# Patient Record
Sex: FEMALE | Race: BLACK OR AFRICAN AMERICAN | URBAN - METROPOLITAN AREA
[De-identification: names, ages, dates, MRNs, and addresses within clinical notes are randomized per-mention and may not be internally consistent; named-entity substitution may affect disease eponyms.]

---
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
## 2022-08-13 ENCOUNTER — APPOINTMENT (OUTPATIENT)
Dept: ULTRASOUND IMAGING | Age: 22
End: 2022-08-13
Attending: EMERGENCY MEDICINE
Payer: MEDICAID

## 2022-08-13 ENCOUNTER — HOSPITAL ENCOUNTER (EMERGENCY)
Age: 22
Discharge: HOME | End: 2022-08-14
Attending: EMERGENCY MEDICINE | Admitting: EMERGENCY MEDICINE
Payer: MEDICAID

## 2022-08-13 VITALS
RESPIRATION RATE: 16 BRPM | SYSTOLIC BLOOD PRESSURE: 108 MMHG | HEART RATE: 54 BPM | WEIGHT: 146.39 LBS | OXYGEN SATURATION: 100 % | TEMPERATURE: 97.8 F | DIASTOLIC BLOOD PRESSURE: 72 MMHG

## 2022-08-13 DIAGNOSIS — Z32.01 POSITIVE PREGNANCY TEST: Primary | ICD-10-CM

## 2022-08-13 LAB — HCG UR QL: NEGATIVE

## 2022-08-13 PROCEDURE — 99284 EMERGENCY DEPT VISIT MOD MDM: CPT

## 2022-08-13 PROCEDURE — 99284 EMERGENCY DEPT VISIT MOD MDM: CPT | Performed by: EMERGENCY MEDICINE

## 2022-08-13 PROCEDURE — 76801 OB US < 14 WKS SINGLE FETUS: CPT

## 2022-08-13 PROCEDURE — 81025 URINE PREGNANCY TEST: CPT

## 2022-08-13 PROCEDURE — 76817 TRANSVAGINAL US OBSTETRIC: CPT

## 2022-08-14 NOTE — ED TRIAGE NOTES
Pt states she had a blood test for a possible pregnancy today and was told it was positive. Came here exclusively for an [address].

## 2022-08-14 NOTE — DISCHARGE INSTRUCTIONS
Your ultrasound did not show a pregnancy. THIS DOES NOT MEAN YOU ARE NOT PREGNANT. A positive pregnancy test (blood or urine) is positive well before you can see a pregnancy on ultrasound. You should continue to check urine pregnancy tests over the course of the next few weeks.

## 2022-08-14 NOTE — ED NOTES
MD aware of HR at discharge. Patient educated that although ultrasound is negative, she could still be pregnant. Patient instructed to follow up with OBGYN and to not drink alcohol/use drugs until it is definitely determined whether or not she is pregnant. Patient verbalizes understanding. Pt discharged home with boyfriend. Pt acting age appropriately, respirations regular and unlabored, cap refill less than two seconds. Skin warm, dry, and intact. Lungs clear bilaterally. No further complaints at this time. Patient verbalized understanding of discharge paperwork and has no further questions at this time. Education provided about continuation of care, follow up care and medication administration. Patient able to provide teach back about discharge instructions.

## 2022-08-14 NOTE — ED PROVIDER NOTES
HPI     Healthy 3year-old female here for a pelvic ultrasound. She says she was at [facility] earlier today and had a negative urine pregnancy test but had a positive blood pregnancy test.  She says the level in the blood test was \"5\". She says they told her they would not do an ultrasound in the emergency department but she can have one done as an outpatient. She left and came here to have the ultrasound done. She is not having abdominal pain. No vaginal discharge or bleeding. She has no complaints at all. Her last period was 3 months ago. She is sexually active. Past Medical History:   Diagnosis Date    Bipolar 1 disorder ([facility] [address] [facility]     [facility]     Mood disorder ([facility]        History reviewed. No pertinent surgical history. Family History:   Problem Relation Age of Onset    No Known Problems Mother        Social History     Socioeconomic History    Marital status: SINGLE     Spouse name: Not on file    Number of children: Not on file    Years of education: Not on file    Highest education level: Not on file   Occupational History    Not on file   Tobacco Use    Smoking status: Every Day     Packs/day: 0.25     Years: 2.00     Pack years: 0.50     Types: Cigarettes    Smokeless tobacco: Current   Substance and Sexual Activity    Alcohol use: Yes    Drug use: Yes     Types: Marijuana    Sexual activity: Yes     Partners: Male     Birth control/protection: None   Other Topics Concern    Not on file   Social History Narrative    Not on file     Social Determinants of Health     Financial Resource Strain: Not on file   Food Insecurity: Not on file   Transportation Needs: Not on file   Physical Activity: Not on file   Stress: Not on file   Social Connections: Not on file   Intimate Partner Violence: Not on file   Housing Stability: Not on file         ALLERGIES: Chocolate flavor    Review of Systems  Review of Systems   Constitutional: (-) weight loss.    HEENT: (-) stiff neck   Eyes: (-) discharge. Respiratory: (-) cough. Cardiovascular: (-) syncope. Gastrointestinal: (-) blood in stool. Genitourinary: (-) hematuria. Musculoskeletal: (-) myalgias. Neurological: (-) seizure. Skin: (-) petechiae  Lymph/Immunologic: (-) enlarged lymph nodes  All other systems reviewed and are negative. Vitals:    08/13/22 2315   Weight: 66.4 kg (146 lb 6.2 oz)            Physical Exam Nursing note and vitals reviewed. Constitutional: oriented to person, place, and time. appears well-developed and well-nourished. No distress. Head: Normocephalic and atraumatic. Sclera anicteric  Nose: No rhinorrhea  Mouth/Throat: Oropharynx is clear and moist. Pharynx normal  Eyes: Conjunctivae are normal. Pupils are equal, round, and reactive to light. Right eye exhibits no discharge. Left eye exhibits no discharge. Neck: Painless normal range of motion. Neck supple. No LAD. Cardiovascular: Normal rate, regular rhythm, normal heart sounds and intact distal pulses. Exam reveals no gallop and no friction rub. No murmur heard. Pulmonary/Chest:  No respiratory distress. No wheezes. No rales. No rhonchi. No increased work of breathing. No accessory muscle use. Good air exchange throughout. Abdominal: soft, non-tender, no rebound or guarding. No hepatosplenomegaly. Normal bowel sounds throughout. Back: no tenderness to palpation, no deformities, no CVA tenderness  Extremities/Musculoskeletal: Normal range of motion. no tenderness. No edema. Distal extremities are neurovasc intact. Lymphadenopathy:   No adenopathy. Neurological:  Alert and oriented to person, place, and time. Coordination normal. CN 2-12 intact. Motor and sensory function intact. Skin: Skin is warm and dry. No rash noted. No pallor. MDM  61-year-old female here for an ultrasound to confirm pregnancy. I explained to her in great detail that if her serum quant was in fact 5, an ultrasound would not confirm her pregnancy.   Despite this she is insistent on getting ultrasound. It is not really indicated or needed emergently.        Procedures

## 2022-10-02 ENCOUNTER — HOSPITAL ENCOUNTER (EMERGENCY)
Age: 22
Discharge: HOME | End: 2022-10-02
Attending: EMERGENCY MEDICINE
Payer: MEDICAID

## 2022-10-02 ENCOUNTER — APPOINTMENT (OUTPATIENT)
Dept: ULTRASOUND IMAGING | Age: 22
End: 2022-10-02
Attending: NURSE PRACTITIONER
Payer: MEDICAID

## 2022-10-02 VITALS
OXYGEN SATURATION: 100 % | TEMPERATURE: 97.8 F | RESPIRATION RATE: 16 BRPM | HEART RATE: 65 BPM | DIASTOLIC BLOOD PRESSURE: 59 MMHG | SYSTOLIC BLOOD PRESSURE: 110 MMHG

## 2022-10-02 DIAGNOSIS — N93.9 VAGINAL BLEEDING: Primary | ICD-10-CM

## 2022-10-02 DIAGNOSIS — E87.6 HYPOKALEMIA: ICD-10-CM

## 2022-10-02 DIAGNOSIS — N83.202 LEFT OVARIAN CYST: ICD-10-CM

## 2022-10-02 DIAGNOSIS — R10.13 ABDOMINAL PAIN, EPIGASTRIC: ICD-10-CM

## 2022-10-02 LAB
ABO + RH BLD: NORMAL
ALBUMIN SERPL-MCNC: 3.9 G/DL (ref 3.5–5)
ALBUMIN/GLOB SERPL: 0.9 {RATIO} (ref 1.1–2.2)
ALP SERPL-CCNC: 63 U/L (ref 45–117)
ALT SERPL-CCNC: 23 U/L (ref 12–78)
ANION GAP SERPL CALC-SCNC: 5 MMOL/L (ref 5–15)
APPEARANCE UR: ABNORMAL
AST SERPL-CCNC: 23 U/L (ref 15–37)
BACTERIA URNS QL MICRO: ABNORMAL /HPF
BASOPHILS # BLD: 0.1 K/UL (ref 0–0.1)
BASOPHILS NFR BLD: 1 % (ref 0–1)
BILIRUB SERPL-MCNC: 0.7 MG/DL (ref 0.2–1)
BILIRUB UR QL: NEGATIVE
BLOOD GROUP ANTIBODIES SERPL: NORMAL
BUN SERPL-MCNC: 6 MG/DL (ref 6–20)
BUN/CREAT SERPL: 7 (ref 12–20)
CALCIUM SERPL-MCNC: 9.4 MG/DL (ref 8.5–10.1)
CHLORIDE SERPL-SCNC: 108 MMOL/L (ref 97–108)
CO2 SERPL-SCNC: 25 MMOL/L (ref 21–32)
COLOR UR: ABNORMAL
COMMENT, HOLDF: NORMAL
CREAT SERPL-MCNC: 0.84 MG/DL (ref 0.55–1.02)
DIFFERENTIAL METHOD BLD: ABNORMAL
EOSINOPHIL # BLD: 0.8 K/UL (ref 0–0.4)
EOSINOPHIL NFR BLD: 10 % (ref 0–7)
EPITH CASTS URNS QL MICRO: ABNORMAL /LPF
ERYTHROCYTE [DISTWIDTH] IN BLOOD BY AUTOMATED COUNT: 12.6 % (ref 11.5–14.5)
GLOBULIN SER CALC-MCNC: 4.2 G/DL (ref 2–4)
GLUCOSE SERPL-MCNC: 128 MG/DL (ref 65–100)
GLUCOSE UR STRIP.AUTO-MCNC: NEGATIVE MG/DL
HCG SERPL-ACNC: <1 MIU/ML (ref 0–6)
HCT VFR BLD AUTO: 38.3 % (ref 35–47)
HGB BLD-MCNC: 13 G/DL (ref 11.5–16)
HGB UR QL STRIP: ABNORMAL
HYALINE CASTS URNS QL MICRO: ABNORMAL /LPF (ref 0–5)
IMM GRANULOCYTES # BLD AUTO: 0 K/UL (ref 0–0.04)
IMM GRANULOCYTES NFR BLD AUTO: 0 % (ref 0–0.5)
KETONES UR QL STRIP.AUTO: ABNORMAL MG/DL
LEUKOCYTE ESTERASE UR QL STRIP.AUTO: ABNORMAL
LIPASE SERPL-CCNC: 68 U/L (ref 73–393)
LYMPHOCYTES # BLD: 3.4 K/UL (ref 0.8–3.5)
LYMPHOCYTES NFR BLD: 41 % (ref 12–49)
MCH RBC QN AUTO: 29.6 PG (ref 26–34)
MCHC RBC AUTO-ENTMCNC: 33.9 G/DL (ref 30–36.5)
MCV RBC AUTO: 87.2 FL (ref 80–99)
MONOCYTES # BLD: 0.6 K/UL (ref 0–1)
MONOCYTES NFR BLD: 7 % (ref 5–13)
NEUTS SEG # BLD: 3.4 K/UL (ref 1.8–8)
NEUTS SEG NFR BLD: 41 % (ref 32–75)
NITRITE UR QL STRIP.AUTO: NEGATIVE
NRBC # BLD: 0 K/UL (ref 0–0.01)
NRBC BLD-RTO: 0 PER 100 WBC
PH UR STRIP: 6 [PH] (ref 5–8)
PLATELET # BLD AUTO: 275 K/UL (ref 150–400)
PMV BLD AUTO: 9.8 FL (ref 8.9–12.9)
POTASSIUM SERPL-SCNC: 3 MMOL/L (ref 3.5–5.1)
PROT SERPL-MCNC: 8.1 G/DL (ref 6.4–8.2)
PROT UR STRIP-MCNC: 100 MG/DL
RBC # BLD AUTO: 4.39 M/UL (ref 3.8–5.2)
RBC #/AREA URNS HPF: >100 /HPF (ref 0–5)
SAMPLES BEING HELD,HOLD: NORMAL
SODIUM SERPL-SCNC: 138 MMOL/L (ref 136–145)
SP GR UR REFRACTOMETRY: 1.02 (ref 1–1.03)
SPECIMEN EXP DATE BLD: NORMAL
UR CULT HOLD, URHOLD: NORMAL
UROBILINOGEN UR QL STRIP.AUTO: 1 EU/DL (ref 0.2–1)
WBC # BLD AUTO: 8.2 K/UL (ref 3.6–11)
WBC URNS QL MICRO: ABNORMAL /HPF (ref 0–4)

## 2022-10-02 PROCEDURE — 81001 URINALYSIS AUTO W/SCOPE: CPT

## 2022-10-02 PROCEDURE — 83690 ASSAY OF LIPASE: CPT

## 2022-10-02 PROCEDURE — 36415 COLL VENOUS BLD VENIPUNCTURE: CPT

## 2022-10-02 PROCEDURE — 74011250637 HC RX REV CODE- 250/637: Performed by: NURSE PRACTITIONER

## 2022-10-02 PROCEDURE — 76830 TRANSVAGINAL US NON-OB: CPT

## 2022-10-02 PROCEDURE — 86900 BLOOD TYPING SEROLOGIC ABO: CPT

## 2022-10-02 PROCEDURE — 85025 COMPLETE CBC W/AUTO DIFF WBC: CPT

## 2022-10-02 PROCEDURE — 84702 CHORIONIC GONADOTROPIN TEST: CPT

## 2022-10-02 PROCEDURE — 76856 US EXAM PELVIC COMPLETE: CPT

## 2022-10-02 PROCEDURE — 80053 COMPREHEN METABOLIC PANEL: CPT

## 2022-10-02 PROCEDURE — 99284 EMERGENCY DEPT VISIT MOD MDM: CPT

## 2022-10-02 RX ORDER — POTASSIUM CHLORIDE 750 MG/1
40 TABLET, FILM COATED, EXTENDED RELEASE ORAL
Status: COMPLETED | OUTPATIENT
Start: 2022-10-02 | End: 2022-10-02

## 2022-10-02 RX ADMIN — POTASSIUM CHLORIDE 40 MEQ: 750 TABLET, FILM COATED, EXTENDED RELEASE ORAL at 02:57

## 2022-10-02 NOTE — Clinical Note
[address]  [address] [facility] [address]  [phone]    Work/School Note    Date: 10/2/2022    To Whom It May concern:    Benji Regan was seen and treated today in the emergency room by the following provider(s):  Attending Provider: Fina Lewis MD  Nurse Practitioner: Vishnu Vo NP. Benji Regan is excused from work/school on 10/02/22 and 10/03/22. She is medically clear to return to work/school on 10/4/2022.        Sincerely,          Minnie Moura NP

## 2022-10-02 NOTE — ED PROVIDER NOTES
FRANCISCO Bingham is a   23 y.o. female with Hx of bipolar d/o, borderline personality d/o who presents ambulatory to [facility] ED with cc of vaginal bleeding. Patient states that she believes that she is pregnant. She reports a 3-day history of vaginal bleeding, and upper abdominal pain that only occurs with laughing. She states that she was seen at a local emergency room, told that her beta-hCG was 5, so likely early pregnancy. Her OB appointment is not until later in October. Denies vaginal discharge, F/C, N/V/D, cough, congestion, CP, SOB, dysuria, urinary frequency/hesitancy, flank pain. Reports THC use, denies alcohol or other substance abuse. PCP: Other, None, MD    There are no other complaints, changes or physical findings at this time.     Past Medical History:   Diagnosis Date    Bipolar 1 disorder ([facility]     Borderline personality disorder ([facility]     Mood disorder ([facility]        No past surgical history on file.       Family History:   Problem Relation Age of Onset    No Known Problems Mother        Social History     Socioeconomic History    Marital status: SINGLE     Spouse name: Not on file    Number of children: Not on file    Years of education: Not on file    Highest education level: Not on file   Occupational History    Not on file   Tobacco Use    Smoking status: Every Day     Packs/day: 0.25     Years: 2.00     Pack years: 0.50     Types: Cigarettes    Smokeless tobacco: Current   Substance and Sexual Activity    Alcohol use: Yes    Drug use: Yes     Types: Marijuana    Sexual activity: Yes     Partners: Male     Birth control/protection: None   Other Topics Concern    Not on file   Social History Narrative    Not on file     Social Determinants of Health     Financial Resource Strain: Not on file   Food Insecurity: Not on file   Transportation Needs: Not on file   Physical Activity: Not on file   Stress: Not on file   Social Connections: Not on file   Intimate Partner Violence: Not on file   Housing Stability: Not on file         ALLERGIES: Chocolate flavor    Review of Systems   Constitutional:  Negative for activity change, appetite change, chills and fever. HENT:  Negative for congestion, rhinorrhea and sore throat. Eyes:  Negative for visual disturbance. Respiratory:  Negative for cough and shortness of breath. Cardiovascular:  Negative for chest pain. Gastrointestinal:  Positive for abdominal pain and nausea. Negative for diarrhea and vomiting. Genitourinary:  Positive for vaginal bleeding. Negative for dysuria, flank pain, frequency and urgency. Musculoskeletal:  Negative for arthralgias, back pain and neck pain. Skin:  Negative for color change and rash. Neurological:  Negative for dizziness, weakness and headaches. Psychiatric/Behavioral:  Negative for agitation, behavioral problems and confusion. All other systems reviewed and are negative. Vitals:    10/02/22 0142   BP: (!) 96/54   Pulse: 62   Resp: 16   Temp: 98 °F (36.7 °C)   SpO2: 100%            Physical Exam  Vitals and nursing note reviewed. Constitutional:       General: She is not in acute distress. Appearance: She is well-developed. HENT:      Head: Normocephalic and atraumatic. Right Ear: External ear normal.      Left Ear: External ear normal.   Eyes:      Conjunctiva/sclera: Conjunctivae normal.      Pupils: Pupils are equal, round, and reactive to light. Cardiovascular:      Rate and Rhythm: Normal rate and regular rhythm. Heart sounds: Normal heart sounds. Pulmonary:      Effort: Pulmonary effort is normal.      Breath sounds: Normal breath sounds. Abdominal:      Palpations: Abdomen is soft. Tenderness: There is no abdominal tenderness. There is no guarding or rebound. Musculoskeletal:         General: Normal range of motion. Cervical back: Normal range of motion and neck supple. Skin:     General: Skin is warm and dry.    Neurological:      Mental Status: She is alert and oriented to person, place, and time. Psychiatric:         Behavior: Behavior normal.         Thought Content: Thought content normal.         Judgment: Judgment normal.        MDM  Number of Diagnoses or Management Options  Abdominal pain, epigastric  Hypokalemia  Left ovarian cyst  Vaginal bleeding  Diagnosis management comments: Ddx: Menses, vaginal bleeding, pregnancy, threatened AB     Patient presents to the emergency department with concern for vaginal bleeding, upper abdominal pain that occurs when laughing. No evidence of pregnancy on beta-hCG, ultrasound has left ovarian cyst only. Abdomen is soft and nontender on exam, other labs are reassuring. Bleeding is likely menstrual cycle. Encouraged outpatient OB/GYN follow-up. Reasons to return to the ER were provided. Amount and/or Complexity of Data Reviewed  Clinical lab tests: ordered and reviewed  Tests in the radiology section of CPT®: ordered and reviewed  Review and summarize past medical records: yes           Procedures      LABORATORY TESTS:  Recent Results (from the past 12 hour(s))   TYPE & SCREEN    Collection Time: 10/02/22  1:52 AM   Result Value Ref Range    Crossmatch Expiration 10/05/2022,2359     ABO/Rh(D) Mary [place] Bill POSITIVE     Antibody screen NEG    CBC WITH AUTOMATED DIFF    Collection Time: 10/02/22  1:55 AM   Result Value Ref Range    WBC 8.2 3.6 - 11.0 K/uL    RBC 4.39 3.80 - 5.20 M/uL    HGB 13.0 11.5 - 16.0 g/dL    HCT 38.3 35.0 - 47.0 %    MCV 87.2 80.0 - 99.0 FL    MCH 29.6 26.0 - 34.0 PG    MCHC 33.9 30.0 - 36.5 g/dL    RDW 12.6 11.5 - 14.5 %    PLATELET 881 972 - 606 K/uL    MPV 9.8 8.9 - 12.9 FL    NRBC 0.0 0  WBC    ABSOLUTE NRBC 0.00 0.00 - 0.01 K/uL    NEUTROPHILS 41 32 - 75 %    LYMPHOCYTES 41 12 - 49 %    MONOCYTES 7 5 - 13 %    EOSINOPHILS 10 (H) 0 - 7 %    BASOPHILS 1 0 - 1 %    IMMATURE GRANULOCYTES 0 0.0 - 0.5 %    ABS. NEUTROPHILS 3.4 1.8 - 8.0 K/UL    ABS.  LYMPHOCYTES 3.4 0.8 - 3.5 K/UL    ABS. MONOCYTES 0.6 0.0 - 1.0 K/UL    ABS. EOSINOPHILS 0.8 (H) 0.0 - 0.4 K/UL    ABS. BASOPHILS 0.1 0.0 - 0.1 K/UL    ABS. IMM. GRANS. 0.0 0.00 - 0.04 K/UL    DF AUTOMATED     METABOLIC PANEL, COMPREHENSIVE    Collection Time: 10/02/22  1:55 AM   Result Value Ref Range    Sodium 138 136 - 145 mmol/L    Potassium 3.0 (L) 3.5 - 5.1 mmol/L    Chloride 108 97 - 108 mmol/L    CO2 25 21 - 32 mmol/L    Anion gap 5 5 - 15 mmol/L    Glucose 128 (H) 65 - 100 mg/dL    BUN 6 6 - 20 MG/DL    Creatinine 0.84 0.55 - 1.02 MG/DL    BUN/Creatinine ratio 7 (L) 12 - 20      GFR est AA >60 >60 ml/min/1.73m2    GFR est non-AA >60 >60 ml/min/1.73m2    Calcium 9.4 8.5 - 10.1 MG/DL    Bilirubin, total 0.7 0.2 - 1.0 MG/DL    ALT (SGPT) 23 12 - 78 U/L    AST (SGOT) 23 15 - 37 U/L    Alk. phosphatase 63 45 - 117 U/L    Protein, total 8.1 6.4 - 8.2 g/dL    Albumin 3.9 3.5 - 5.0 g/dL    Globulin 4.2 (H) 2.0 - 4.0 g/dL    A-G Ratio 0.9 (L) 1.1 - 2.2     BETA HCG, QT    Collection Time: 10/02/22  1:55 AM   Result Value Ref Range    Beta HCG, QT <1 0 - 6 MIU/ML   LIPASE    Collection Time: 10/02/22  1:55 AM   Result Value Ref Range    Lipase 68 (L) 73 - 393 U/L   SAMPLES BEING HELD    Collection Time: 10/02/22  1:55 AM   Result Value Ref Range    SAMPLES BEING HELD 1RED,1BLUE     COMMENT        Add-on orders for these samples will be processed based on acceptable specimen integrity and analyte stability, which may vary by analyte.    URINALYSIS W/MICROSCOPIC    Collection Time: 10/02/22  2:18 AM   Result Value Ref Range    Color RED      Appearance CLOUDY (A) CLEAR      Specific gravity 1.021 1.003 - 1.030      pH (UA) 6.0 5.0 - 8.0      Protein 100 (A) NEG mg/dL    Glucose Negative NEG mg/dL    Ketone TRACE (A) NEG mg/dL    Bilirubin Negative NEG      Blood LARGE (A) NEG      Urobilinogen 1.0 0.2 - 1.0 EU/dL    Nitrites Negative NEG      Leukocyte Esterase SMALL (A) NEG      WBC 10-20 0 - 4 /hpf    RBC >100 (H) 0 - 5 /hpf    Epithelial cells MODERATE (A) FEW /lpf    Bacteria 1+ (A) NEG /hpf    Hyaline cast 2-5 0 - 5 /lpf   URINE CULTURE HOLD SAMPLE    Collection Time: 10/02/22  2:18 AM    Specimen: Serum; Urine   Result Value Ref Range    Urine culture hold        Urine on hold in Microbiology dept for 2 days. If unpreserved urine is submitted, it cannot be used for addtional testing after 24 hours, recollection will be required. IMAGING RESULTS:  US TRANSVAGINAL   Final Result   2.1 cm left ovarian cyst. Study otherwise within normal limits. US PELV NON OBS   Final Result   2.1 cm left ovarian cyst. Study otherwise within normal limits. MEDICATIONS GIVEN:  Medications   potassium chloride SR (KLOR-CON 10) tablet 40 mEq (40 mEq Oral Given 10/2/22 0257)       IMPRESSION:  1. Vaginal bleeding    2. Hypokalemia    3. Abdominal pain, epigastric    4. Left ovarian cyst        PLAN:  1. There are no discharge medications for this patient. 2.   Follow-up Information       Follow up With Specialties Details Why Contact Info    [address] DEP Emergency Medicine Go to  As needed, If symptoms worsen [address]  [phone]          3.  Return to ED if worse

## 2022-10-02 NOTE — Clinical Note
[address]  [address] [facility] [address]  [phone]    Work/School Note    Date: 10/2/2022    To Whom It May concern:    Devonte Lake was seen and treated today in the emergency room by the following provider(s):  Attending Provider: Shaan Thomason MD  Nurse Practitioner: Sheng Patton NP. Devonte Lake is excused from work/school on 10/02/22 and 10/03/22. She is medically clear to return to work/school on 10/4/2022.        Sincerely,          Julian Hicks LPN

## 2022-10-02 NOTE — ED TRIAGE NOTES
Pt amb to ed via pov for complaints of vaginal bleeding. Pt reports she is pregnant, she is not sure how many weeks she is. States she has not had a menstrual cycle for 5 months. Reports she had an ultrasound here on the 14th. Pt complains of back pain and mild abdominal pain.  Symptoms started three days ago

## 2022-10-02 NOTE — DISCHARGE INSTRUCTIONS
You are not pregnant based off your lab work finding today from. Your bleeding may just be from a menstrual cycle starting. Please make your OB/GYN aware that you do not appear to be pregnant. Your potassium was low, please make dietary changes to consume more potassium based foods. Take Tylenol or ibuprofen to see if that helps with your abdominal pain. Return to the emergency department for any worsening or worrisome symptoms.

## 2023-02-03 ENCOUNTER — HOSPITAL ENCOUNTER (EMERGENCY)
Age: 23
Discharge: HOME | End: 2023-02-03
Attending: STUDENT IN AN ORGANIZED HEALTH CARE EDUCATION/TRAINING PROGRAM
Payer: MEDICAID

## 2023-02-03 ENCOUNTER — APPOINTMENT (OUTPATIENT)
Dept: ULTRASOUND IMAGING | Age: 23
End: 2023-02-03
Payer: MEDICAID

## 2023-02-03 ENCOUNTER — APPOINTMENT (OUTPATIENT)
Dept: CT IMAGING | Age: 23
End: 2023-02-03
Payer: MEDICAID

## 2023-02-03 VITALS
OXYGEN SATURATION: 100 % | WEIGHT: 144 LBS | BODY MASS INDEX: 20.62 KG/M2 | HEIGHT: 70 IN | DIASTOLIC BLOOD PRESSURE: 61 MMHG | HEART RATE: 70 BPM | SYSTOLIC BLOOD PRESSURE: 106 MMHG | RESPIRATION RATE: 16 BRPM | TEMPERATURE: 98.3 F

## 2023-02-03 DIAGNOSIS — R11.0 NAUSEA WITHOUT VOMITING: ICD-10-CM

## 2023-02-03 DIAGNOSIS — R10.813 RIGHT LOWER QUADRANT ABDOMINAL TENDERNESS, REBOUND TENDERNESS PRESENCE NOT SPECIFIED: ICD-10-CM

## 2023-02-03 DIAGNOSIS — N83.201 RIGHT OVARIAN CYST: Primary | ICD-10-CM

## 2023-02-03 DIAGNOSIS — R10.816 EPIGASTRIC ABDOMINAL TENDERNESS, REBOUND TENDERNESS PRESENCE NOT SPECIFIED: ICD-10-CM

## 2023-02-03 LAB
ALBUMIN SERPL-MCNC: 4.4 G/DL (ref 3.5–5.2)
ALBUMIN/GLOB SERPL: 1.4 (ref 1.1–2.2)
ALP SERPL-CCNC: 66 U/L (ref 35–104)
ALT SERPL-CCNC: 10 U/L (ref 10–35)
ANION GAP SERPL CALC-SCNC: 11 MMOL/L (ref 5–15)
APPEARANCE UR: CLEAR
AST SERPL-CCNC: 15 U/L (ref 10–35)
BACTERIA URNS QL MICRO: NEGATIVE /HPF
BASOPHILS # BLD: 0.1 K/UL (ref 0–1)
BASOPHILS NFR BLD: 1 % (ref 0–1)
BILIRUB DIRECT SERPL-MCNC: <0.2 MG/DL (ref 0–0.3)
BILIRUB SERPL-MCNC: 0.5 MG/DL (ref 0.2–1)
BILIRUB UR QL: NEGATIVE
BUN SERPL-MCNC: 14 MG/DL (ref 6–20)
BUN/CREAT SERPL: 27 (ref 12–20)
CALCIUM SERPL-MCNC: 9.5 MG/DL (ref 8.6–10)
CHLORIDE SERPL-SCNC: 102 MMOL/L (ref 98–107)
CO2 SERPL-SCNC: 26 MMOL/L (ref 22–29)
COLOR UR: ABNORMAL
COMMENT, HOLDF: NORMAL
COMMENT, HOLDF: NORMAL
CREAT SERPL-MCNC: 0.52 MG/DL (ref 0.5–0.9)
DIFFERENTIAL METHOD BLD: ABNORMAL
EOSINOPHIL # BLD: 0.7 K/UL (ref 0–0.4)
EOSINOPHIL NFR BLD: 6 %
EPITH CASTS URNS QL MICRO: ABNORMAL /LPF
ERYTHROCYTE [DISTWIDTH] IN BLOOD BY AUTOMATED COUNT: 12.8 % (ref 11.5–14.5)
GLOBULIN SER CALC-MCNC: 3.2 G/DL (ref 2–4)
GLUCOSE SERPL-MCNC: 88 MG/DL (ref 65–100)
GLUCOSE UR STRIP.AUTO-MCNC: NEGATIVE MG/DL
HCG UR QL: NEGATIVE
HCT VFR BLD AUTO: 38.9 % (ref 35–47)
HGB BLD-MCNC: 12.6 G/DL (ref 11.5–16)
HGB UR QL STRIP: ABNORMAL
IMM GRANULOCYTES # BLD AUTO: 0 K/UL (ref 0–0.04)
IMM GRANULOCYTES NFR BLD AUTO: 0 % (ref 0–0.5)
KETONES UR QL STRIP.AUTO: NEGATIVE MG/DL
LEUKOCYTE ESTERASE UR QL STRIP.AUTO: NEGATIVE
LIPASE SERPL-CCNC: 18 U/L (ref 13–60)
LYMPHOCYTES # BLD: 4.3 K/UL (ref 0.8–3.5)
LYMPHOCYTES NFR BLD: 37 % (ref 12–49)
MCH RBC QN AUTO: 29.2 PG (ref 26–34)
MCHC RBC AUTO-ENTMCNC: 32.4 G/DL (ref 30–36.5)
MCV RBC AUTO: 90 FL (ref 80–99)
MONOCYTES # BLD: 0.8 K/UL (ref 0–1)
MONOCYTES NFR BLD: 7 % (ref 5–13)
NEUTS SEG # BLD: 5.8 K/UL (ref 1.8–8)
NEUTS SEG NFR BLD: 49 % (ref 32–75)
NITRITE UR QL STRIP.AUTO: NEGATIVE
NRBC # BLD: 0 K/UL (ref 0–0.01)
NRBC BLD-RTO: 0 PER 100 WBC
PH UR STRIP: 6.5 (ref 5–8)
PLATELET # BLD AUTO: 325 K/UL (ref 150–400)
PMV BLD AUTO: 9.7 FL (ref 8.9–12.9)
POTASSIUM SERPL-SCNC: 4.4 MMOL/L (ref 3.5–5.1)
PROT SERPL-MCNC: 7.6 G/DL (ref 6.4–8.3)
PROT UR STRIP-MCNC: NEGATIVE MG/DL
RBC # BLD AUTO: 4.32 M/UL (ref 3.8–5.2)
RBC #/AREA URNS HPF: ABNORMAL /HPF
SAMPLES BEING HELD,HOLD: NORMAL
SAMPLES BEING HELD,HOLD: NORMAL
SODIUM SERPL-SCNC: 139 MMOL/L (ref 136–145)
SP GR UR REFRACTOMETRY: 1.02 (ref 1–1.03)
UR CULT HOLD, URHOLD: NORMAL
UROBILINOGEN UR QL STRIP.AUTO: 0.2 EU/DL (ref 0.2–1)
WBC # BLD AUTO: 11.6 K/UL (ref 3.6–11)
WBC URNS QL MICRO: ABNORMAL /HPF (ref 0–4)

## 2023-02-03 PROCEDURE — 74011250636 HC RX REV CODE- 250/636

## 2023-02-03 PROCEDURE — 99285 EMERGENCY DEPT VISIT HI MDM: CPT

## 2023-02-03 PROCEDURE — 83690 ASSAY OF LIPASE: CPT

## 2023-02-03 PROCEDURE — 81001 URINALYSIS AUTO W/SCOPE: CPT

## 2023-02-03 PROCEDURE — 76830 TRANSVAGINAL US NON-OB: CPT

## 2023-02-03 PROCEDURE — 74011000636 HC RX REV CODE- 636: Performed by: STUDENT IN AN ORGANIZED HEALTH CARE EDUCATION/TRAINING PROGRAM

## 2023-02-03 PROCEDURE — 36415 COLL VENOUS BLD VENIPUNCTURE: CPT

## 2023-02-03 PROCEDURE — 80076 HEPATIC FUNCTION PANEL: CPT

## 2023-02-03 PROCEDURE — 76856 US EXAM PELVIC COMPLETE: CPT

## 2023-02-03 PROCEDURE — 74177 CT ABD & PELVIS W/CONTRAST: CPT

## 2023-02-03 PROCEDURE — 96372 THER/PROPH/DIAG INJ SC/IM: CPT

## 2023-02-03 PROCEDURE — 80048 BASIC METABOLIC PNL TOTAL CA: CPT

## 2023-02-03 PROCEDURE — 85025 COMPLETE CBC W/AUTO DIFF WBC: CPT

## 2023-02-03 RX ORDER — ONDANSETRON 4 MG/1
4 TABLET, ORALLY DISINTEGRATING ORAL
Qty: 21 TABLET | Refills: 0 | Status: SHIPPED | OUTPATIENT
Start: 2023-02-03 | End: 2023-02-10

## 2023-02-03 RX ORDER — KETOROLAC TROMETHAMINE 30 MG/ML
30 INJECTION, SOLUTION INTRAMUSCULAR; INTRAVENOUS
Status: COMPLETED | OUTPATIENT
Start: 2023-02-03 | End: 2023-02-03

## 2023-02-03 RX ORDER — ONDANSETRON 4 MG/1
4 TABLET, ORALLY DISINTEGRATING ORAL
Status: COMPLETED | OUTPATIENT
Start: 2023-02-03 | End: 2023-02-03

## 2023-02-03 RX ADMIN — KETOROLAC TROMETHAMINE 30 MG: 30 INJECTION, SOLUTION INTRAMUSCULAR; INTRAVENOUS at 21:01

## 2023-02-03 RX ADMIN — IOPAMIDOL 100 ML: 755 INJECTION, SOLUTION INTRAVENOUS at 23:05

## 2023-02-03 RX ADMIN — ONDANSETRON 4 MG: 4 TABLET, ORALLY DISINTEGRATING ORAL at 21:00

## 2023-02-03 NOTE — Clinical Note
[address]  [facility] EMERGENCY DEPT  [address] [id number] [phone]    Work/School Note    Date: 2/3/2023    To Whom It May concern:      Valentin Jacobson was seen and treated today in the emergency room by the following provider(s):  Attending Provider: Jacek Low  Physician Assistant: Isha Ovalle PA-C. Valentin Jacobson is excused from work/school on 02/03/23. She is clear to return to work/school on 02/04/23.         Sincerely,          Flower Flores PA-C

## 2023-02-03 NOTE — Clinical Note
[address]  [facility] EMERGENCY DEPT  [address] [id number] [phone]    Work/School Note    Date: 2/3/2023    To Whom It May concern:      Yodit Orozco was seen and treated today in the emergency room by the following provider(s):  Attending Provider: Bismark Souza  Physician Assistant: Juan Antonio Menjivar PA-C. Yodit Orozco is excused from work/school on 02/03/23. She is clear to return to work/school on 02/04/23.         Sincerely,          Sunni Mello RN

## 2023-02-04 NOTE — DISCHARGE INSTRUCTIONS
Discussed with you about the findings today. If your symptoms worsen, please see your OBGYN, PCP, or return to the ER as needed.

## 2023-02-04 NOTE — ED NOTES
Patient was discharged at [phone] . Patient verbalized understanding of all discharge instructions. Patient alert and oriented, no acute distress when leaving ED. Patient ambulatory when leaving ED.

## 2023-02-04 NOTE — ED TRIAGE NOTES
Patient presents to ED with c/o abdominal pain and vaginal bleeding for 1 week. Patient states that she has the nexplanon implant and does not have a regular menstrual cycle.

## 2023-02-04 NOTE — ED PROVIDER NOTES
A 23 y.o. female with history of borderline personality disorder with bipolar 1 disorder presents today with right lower abdominal pain that feels like \"random stabs\". There is nothing that makes it worse to better and the pain does not radiate. She also admits dry heaving for the past 2 weeks that feels like regurgitation and she has tried taking Tums and Pepto without any symptom relief. She denies vomiting. She also admits to abnormal vaginal bleeding for 4 weeks now. She had the Nexplanon implanted in October and this is the first episode of prolonged bleeding. She admits that the blood appears like a \"worm with blood on it. \"       Abdominal Pain   Associated symptoms include constipation. Pertinent negatives include no fever, no diarrhea, no vomiting, no dysuria, no frequency, no headaches, no myalgias and no chest pain. Vaginal Bleeding  Associated symptoms include abdominal pain. Pertinent negatives include no chest pain, no headaches and no shortness of breath. Past Medical History:   Diagnosis Date    Bipolar 1 disorder ([facility]     Borderline personality disorder ([facility]     Mood disorder ([facility]        No past surgical history on file.       Family History:   Problem Relation Age of Onset    No Known Problems Mother        Social History     Socioeconomic History    Marital status: SINGLE     Spouse name: Not on file    Number of children: Not on file    Years of education: Not on file    Highest education level: Not on file   Occupational History    Not on file   Tobacco Use    Smoking status: Every Day     Packs/day: 0.25     Years: 2.00     Pack years: 0.50     Types: Cigarettes    Smokeless tobacco: Current   Substance and Sexual Activity    Alcohol use: Yes    Drug use: Yes     Types: Marijuana    Sexual activity: Yes     Partners: Male     Birth control/protection: None   Other Topics Concern    Not on file   Social History Narrative    Not on file     Social Determinants of Health     Financial Resource Strain: Not on file   Food Insecurity: Not on file   Transportation Needs: Not on file   Physical Activity: Not on file   Stress: Not on file   Social Connections: Not on file   Intimate Partner Violence: Not on file   Housing Stability: Not on file         ALLERGIES: Chocolate flavor    Review of Systems   Constitutional:  Negative for activity change and fever. HENT:  Negative for congestion, rhinorrhea and sore throat. Eyes:  Negative for discharge. Respiratory:  Negative for chest tightness and shortness of breath. Cardiovascular:  Negative for chest pain. Gastrointestinal:  Positive for abdominal pain and constipation. Negative for diarrhea and vomiting. Regurgitation    Genitourinary:  Positive for menstrual problem and vaginal bleeding. Negative for dysuria, frequency and urgency. Musculoskeletal:  Negative for myalgias. Skin: Negative. Neurological:  Negative for dizziness and headaches. Psychiatric/Behavioral:  Negative for agitation and behavioral problems. Vitals:    02/03/23 2014   BP: 123/84   Pulse: 95   Resp: 18   Temp: 97.8 °F (36.6 °C)   SpO2: 100%   Weight: 65.3 kg (144 lb)   Height: 5' 10\" (1.778 m)            Physical Exam  Vitals reviewed. Constitutional:       General: She is not in acute distress. Appearance: Normal appearance. She is well-developed. She is not ill-appearing. HENT:      Head: Normocephalic and atraumatic. Nose: Nose normal.      Mouth/Throat:      Mouth: Mucous membranes are moist.      Pharynx: Oropharynx is clear. Eyes:      Extraocular Movements: Extraocular movements intact. Pupils: Pupils are equal, round, and reactive to light. Cardiovascular:      Rate and Rhythm: Normal rate and regular rhythm. Pulses: Normal pulses. Heart sounds: Normal heart sounds. Pulmonary:      Effort: Pulmonary effort is normal.      Breath sounds: Normal breath sounds.    Abdominal:      General: Bowel sounds are normal. Palpations: Abdomen is soft. Tenderness: There is abdominal tenderness in the right lower quadrant, epigastric area and suprapubic area. Musculoskeletal:         General: No tenderness. Cervical back: Normal range of motion. No tenderness. Skin:     General: Skin is warm. Capillary Refill: Capillary refill takes less than 2 seconds. Coloration: Skin is not pale. Neurological:      General: No focal deficit present. Mental Status: She is alert and oriented to person, place, and time. Psychiatric:         Mood and Affect: Mood normal.         Behavior: Behavior normal.        Medical Decision Making  A 23 y.o. female presents with reports of random stabbing right lower quadrant abdominal pain, vaginal bleeding x4 weeks with Nexplanon, and regurgitation for 2 weeks. Zofran and Toradol was given. Zofran gave her symptom relief today. CBC shows leukocytosis at 11.6. Urine pregnancy negative. UA shows small amount of hematuria. Pelvic US shows 3.2 simple cyst right ovary and otherwise unremarkable. CT of the abdomen shows multiple tiny nonobstructive calculi in the kidneys bilaterally, without hydronephrosis and menstrual cycle-related changes in the pelvis. Discussed with the patient these results and appropriate for discharge at this time with a Zofran prescription. Return precautions discussed and to schedule an appointment with a PCP or OBGYN. Problems Addressed:  Epigastric abdominal tenderness, rebound tenderness presence not specified: acute illness or injury  Nausea without vomiting: acute illness or injury  Right lower quadrant abdominal tenderness, rebound tenderness presence not specified: acute illness or injury  Right ovarian cyst: acute illness or injury    Amount and/or Complexity of Data Reviewed  Labs: ordered. Decision-making details documented in ED Course. Radiology: ordered. Decision-making details documented in ED Course.            Procedures

## 2023-03-06 ENCOUNTER — HOSPITAL ENCOUNTER (EMERGENCY)
Age: 23
Discharge: HOME | End: 2023-03-06
Attending: EMERGENCY MEDICINE
Payer: MEDICAID

## 2023-03-06 ENCOUNTER — APPOINTMENT (OUTPATIENT)
Dept: GENERAL RADIOLOGY | Age: 23
End: 2023-03-06
Attending: NURSE PRACTITIONER
Payer: MEDICAID

## 2023-03-06 VITALS
RESPIRATION RATE: 16 BRPM | OXYGEN SATURATION: 98 % | HEIGHT: 70 IN | WEIGHT: 150 LBS | TEMPERATURE: 97.8 F | BODY MASS INDEX: 21.47 KG/M2 | DIASTOLIC BLOOD PRESSURE: 67 MMHG | SYSTOLIC BLOOD PRESSURE: 109 MMHG | HEART RATE: 76 BPM

## 2023-03-06 DIAGNOSIS — K59.00 CONSTIPATION, UNSPECIFIED CONSTIPATION TYPE: Primary | ICD-10-CM

## 2023-03-06 DIAGNOSIS — N92.6 IRREGULAR MENSTRUAL CYCLE: ICD-10-CM

## 2023-03-06 LAB
APPEARANCE UR: ABNORMAL
BACTERIA URNS QL MICRO: NEGATIVE /HPF
BILIRUB UR QL: NEGATIVE
COLOR UR: ABNORMAL
EPITH CASTS URNS QL MICRO: ABNORMAL /LPF
GLUCOSE UR STRIP.AUTO-MCNC: NEGATIVE MG/DL
HCG UR QL: NEGATIVE
HGB UR QL STRIP: ABNORMAL
KETONES UR QL STRIP.AUTO: NEGATIVE MG/DL
LEUKOCYTE ESTERASE UR QL STRIP.AUTO: NEGATIVE
NITRITE UR QL STRIP.AUTO: NEGATIVE
PH UR STRIP: 8 (ref 5–8)
PROT UR STRIP-MCNC: NEGATIVE MG/DL
RBC #/AREA URNS HPF: ABNORMAL /HPF
SP GR UR REFRACTOMETRY: 1.01 (ref 1–1.03)
UR CULT HOLD, URHOLD: NORMAL
UROBILINOGEN UR QL STRIP.AUTO: 0.2 EU/DL (ref 0.2–1)
WBC URNS QL MICRO: ABNORMAL /HPF (ref 0–4)

## 2023-03-06 PROCEDURE — 74018 RADEX ABDOMEN 1 VIEW: CPT

## 2023-03-06 PROCEDURE — 99284 EMERGENCY DEPT VISIT MOD MDM: CPT

## 2023-03-06 PROCEDURE — 81001 URINALYSIS AUTO W/SCOPE: CPT

## 2023-03-06 NOTE — DISCHARGE INSTRUCTIONS
Thank you for coming to the Emergency Department. You are constipated. Please take 8 capfuls of MiraLAX in 32 ounces of water when you get home. Please drink plenty of fluids please start a bowel regimen.

## 2023-03-06 NOTE — ED NOTES
Patient discharged by provider. D/C instructions given. Patient educated to take all medications as instructed for management at home. Patien verbalized understanding, verbalized no questions. PIV removed, pressure dressing applied. Patient ambulated out of ER without difficulty, NAD.   Patient Vitals for the past 4 hrs:   Temp Pulse Resp BP SpO2   03/06/23 1531 97.8 °F (36.6 °C) 76 16 109/67 98 % respirations non-labored/good air movement/breath sounds equal/clear to auscultation bilaterally

## 2023-03-06 NOTE — ED PROVIDER NOTES
Edmond Freedman is a 23 y.o. female with Hx of Polar disorder who presents with mother to see ED with cc of vaginal bleeding, generalized abdominal pain and flank tightness. Patient states she is not sure but she does not believe she is having vaginal bleeding because she has not had a period in over 8 months but she did have her Nexplanon implant removed approximately the beginning of February. Patient has vague description of generalized abdominal pain with some flank tightness. She denies cough or cold, she denies chest pain, shortness of breath, she states she does suffer with constipation. PCP: None    There are no other complaints, changes or physical findings at this time. Abdominal Pain   Pertinent negatives include no fever, no diarrhea, no nausea, no vomiting, no dysuria, no frequency, no hematuria, no headaches, no arthralgias, no myalgias and no chest pain. Past Medical History:   Diagnosis Date    [facility]     Borderline personality disorder ([email or web address]     Mood disorder ([facility] [[email or web address] 75.)        History reviewed. No pertinent surgical history.       Family History:   Problem Relation Age of Onset    No Known Problems Mother        Social History     Socioeconomic History    Marital status: SINGLE     Spouse name: Not on file    Number of children: Not on file    Years of education: Not on file    Highest education level: Not on file   Occupational History    Not on file   Tobacco Use    Smoking status: Former     Packs/day: 0.25     Years: 2.00     Pack years: 0.50     Types: Cigarettes    Smokeless tobacco: Former   Substance and Sexual Activity    Alcohol use: Not Currently    Drug use: Not Currently     Types: Marijuana    Sexual activity: Yes     Partners: Male     Birth control/protection: None   Other Topics Concern    Not on file   Social History Narrative    Not on file     Social Determinants of Health     Financial Resource Strain: Not on file   Food Insecurity: Not on file Transportation Needs: Not on file   Physical Activity: Not on file   Stress: Not on file   Social Connections: Not on file   Intimate Partner Violence: Not on file   Housing Stability: Not on file         ALLERGIES: Chocolate flavor    Review of Systems   Constitutional:  Negative for activity change, appetite change, chills and fever. HENT:  Negative for congestion, rhinorrhea and sore throat. Eyes:  Negative for visual disturbance. Respiratory:  Negative for cough and shortness of breath. Cardiovascular:  Negative for chest pain. Gastrointestinal:  Positive for abdominal pain. Negative for abdominal distention, diarrhea, nausea and vomiting. Genitourinary:  Positive for menstrual problem and vaginal bleeding. Negative for decreased urine volume, difficulty urinating, dyspareunia, dysuria, flank pain, frequency, genital sores, hematuria, urgency, vaginal discharge and vaginal pain. Musculoskeletal:  Negative for arthralgias, gait problem and myalgias. Skin:  Negative for color change and rash. Neurological:  Negative for weakness and headaches. Psychiatric/Behavioral:  Negative for agitation, behavioral problems and confusion. Vitals:    03/06/23 1531   BP: 109/67   Pulse: 76   Resp: 16   Temp: 97.8 °F (36.6 °C)   SpO2: 98%   Weight: 68 kg (150 lb)   Height: 5' 10\" (1.778 m)            Physical Exam  Vitals and nursing note reviewed. Constitutional:       Appearance: Normal appearance. HENT:      Head: Normocephalic and atraumatic. Right Ear: External ear normal.      Left Ear: External ear normal.   Eyes:      Extraocular Movements: Extraocular movements intact. Conjunctiva/sclera: Conjunctivae normal.      Pupils: Pupils are equal, round, and reactive to light. Cardiovascular:      Rate and Rhythm: Normal rate and regular rhythm. Heart sounds: Normal heart sounds. Pulmonary:      Effort: Pulmonary effort is normal.      Breath sounds: Normal breath sounds. Abdominal:      General: Abdomen is flat. Bowel sounds are normal.      Palpations: Abdomen is soft. Tenderness: There is generalized abdominal tenderness. There is no right CVA tenderness, left CVA tenderness, guarding or rebound. Negative signs include Pagan's sign, Rovsing's sign, McBurney's sign, psoas sign and obturator sign. Hernia: No hernia is present. Genitourinary:     Vagina: Bleeding present. Musculoskeletal:         General: Normal range of motion. Cervical back: Normal range of motion and neck supple. Skin:     General: Skin is warm and dry. Neurological:      General: No focal deficit present. Mental Status: She is alert and oriented to person, place, and time. Mental status is at baseline. Psychiatric:         Mood and Affect: Mood normal.         Thought Content: Thought content normal.         Judgment: Judgment normal.        Medical Decision Making  82-year-old female in with vague complaint of vaginal bleeding which she does not believe is her period because she had her Nexplanon taken out in February. I did check her vaginal vault and she definitely has vaginal bleeding. So we discussed this is her current menstrual cycle. Patient's abdomen diffusely tender. She states she has not had a bowel movement in several days. KUB does show constipation. I have put her on a bowel regimen. And discharge patient home. Amount and/or Complexity of Data Reviewed  Labs: ordered. Radiology: ordered.            Procedures

## 2023-03-06 NOTE — ED TRIAGE NOTES
Patient here with complaints of abdominal pain, flank pain and pelvic pain with vaginal bleeding. Patient reports that she was diagnosed with kidney stones in the past, started with vaginal bleeding yesterday, has not had a menstrual cycle in 8 months, nexplanon removed a month ago.

## 2025-02-08 ENCOUNTER — OFFICE VISIT (OUTPATIENT)
Age: 25
End: 2025-02-08

## 2025-02-08 ENCOUNTER — HOSPITAL ENCOUNTER (EMERGENCY)
Facility: HOSPITAL | Age: 25
Discharge: HOME | End: 2025-02-08

## 2025-02-08 VITALS
WEIGHT: 155 LBS | DIASTOLIC BLOOD PRESSURE: 58 MMHG | SYSTOLIC BLOOD PRESSURE: 107 MMHG | OXYGEN SATURATION: 100 % | HEART RATE: 84 BPM | TEMPERATURE: 97.7 F | RESPIRATION RATE: 18 BRPM | HEIGHT: 69 IN | BODY MASS INDEX: 22.96 KG/M2

## 2025-02-08 VITALS
TEMPERATURE: 97.9 F | RESPIRATION RATE: 18 BRPM | DIASTOLIC BLOOD PRESSURE: 73 MMHG | OXYGEN SATURATION: 96 % | HEART RATE: 86 BPM | SYSTOLIC BLOOD PRESSURE: 103 MMHG | BODY MASS INDEX: 21.81 KG/M2 | WEIGHT: 152 LBS

## 2025-02-08 DIAGNOSIS — N94.9 GENITAL LESION, FEMALE: Primary | ICD-10-CM

## 2025-02-08 DIAGNOSIS — K59.00 CONSTIPATION, UNSPECIFIED CONSTIPATION TYPE: ICD-10-CM

## 2025-02-08 DIAGNOSIS — N89.8 VAGINAL DISCHARGE: Primary | ICD-10-CM

## 2025-02-08 DIAGNOSIS — R30.0 DYSURIA: ICD-10-CM

## 2025-02-08 LAB
APPEARANCE UR: CLEAR
BACTERIA URNS QL MICRO: NEGATIVE /HPF
BILIRUB UR QL: NEGATIVE
COLOR UR: ABNORMAL
EPITH CASTS URNS QL MICRO: ABNORMAL /LPF
GLUCOSE UR STRIP.AUTO-MCNC: NEGATIVE MG/DL
HCG UR QL: NEGATIVE
HGB UR QL STRIP: NEGATIVE
KETONES UR QL STRIP.AUTO: ABNORMAL MG/DL
LEUKOCYTE ESTERASE UR QL STRIP.AUTO: ABNORMAL
MUCOUS THREADS URNS QL MICRO: ABNORMAL /LPF
NITRITE UR QL STRIP.AUTO: NEGATIVE
PH UR STRIP: 7 (ref 5–8)
PROT UR STRIP-MCNC: NEGATIVE MG/DL
RBC #/AREA URNS HPF: ABNORMAL /HPF (ref 0–5)
SP GR UR REFRACTOMETRY: 1.01
URINE CULTURE IF INDICATED: ABNORMAL
UROBILINOGEN UR QL STRIP.AUTO: 1 EU/DL (ref 0.2–1)
WBC URNS QL MICRO: ABNORMAL /HPF (ref 0–4)

## 2025-02-08 PROCEDURE — 87147 CULTURE TYPE IMMUNOLOGIC: CPT

## 2025-02-08 PROCEDURE — 81025 URINE PREGNANCY TEST: CPT

## 2025-02-08 PROCEDURE — 99283 EMERGENCY DEPT VISIT LOW MDM: CPT

## 2025-02-08 PROCEDURE — 87086 URINE CULTURE/COLONY COUNT: CPT

## 2025-02-08 PROCEDURE — 87255 GENET VIRUS ISOLATE HSV: CPT

## 2025-02-08 PROCEDURE — 6370000000 HC RX 637 (ALT 250 FOR IP): Performed by: PHYSICIAN ASSISTANT

## 2025-02-08 PROCEDURE — 81001 URINALYSIS AUTO W/SCOPE: CPT

## 2025-02-08 RX ORDER — CEFTRIAXONE 500 MG/1
500 INJECTION, POWDER, FOR SOLUTION INTRAMUSCULAR; INTRAVENOUS ONCE
Status: COMPLETED | OUTPATIENT
Start: 2025-02-08 | End: 2025-02-08

## 2025-02-08 RX ORDER — VALACYCLOVIR HYDROCHLORIDE 1 G/1
1000 TABLET, FILM COATED ORAL 2 TIMES DAILY
Qty: 20 TABLET | Refills: 0 | Status: SHIPPED | OUTPATIENT
Start: 2025-02-08 | End: 2025-02-18

## 2025-02-08 RX ORDER — POLYETHYLENE GLYCOL 3350 17 G/17G
17 POWDER, FOR SOLUTION ORAL DAILY
Qty: 765 G | Refills: 0 | Status: SHIPPED | OUTPATIENT
Start: 2025-02-08 | End: 2025-03-25

## 2025-02-08 RX ORDER — LIDOCAINE HYDROCHLORIDE 20 MG/ML
JELLY TOPICAL PRN
Status: DISCONTINUED | OUTPATIENT
Start: 2025-02-08 | End: 2025-02-08 | Stop reason: HOSPADM

## 2025-02-08 RX ADMIN — LIDOCAINE HYDROCHLORIDE: 20 JELLY TOPICAL at 20:40

## 2025-02-08 RX ADMIN — CEFTRIAXONE 500 MG: 500 INJECTION, POWDER, FOR SOLUTION INTRAMUSCULAR; INTRAVENOUS at 18:40

## 2025-02-08 ASSESSMENT — PAIN DESCRIPTION - DESCRIPTORS: DESCRIPTORS: SHARP

## 2025-02-08 ASSESSMENT — PAIN - FUNCTIONAL ASSESSMENT: PAIN_FUNCTIONAL_ASSESSMENT: 0-10

## 2025-02-08 ASSESSMENT — PAIN DESCRIPTION - LOCATION: LOCATION: RECTUM;PELVIS

## 2025-02-08 ASSESSMENT — PAIN SCALES - GENERAL: PAINLEVEL_OUTOF10: 4

## 2025-02-08 NOTE — PROGRESS NOTES
Patient Name: Vilma Jara   YOB: 2000   Patient Status: New patient,   Chief Complaint: Urinary Burning (Painful urination and discharge. /X this morning.  /Constipation. /X 1 week )      ____________________________________________________________________________________________    External Records Reviewed: None    Limitation to History: None    Outside Historian: None    SUBJECTIVE/OBJECTIVE:  Vilma Jara is a 24 y.o. female presents with complaint of painful urination starting today with white creamy vaginal discharge for 4-5 days.  She also reports constipation with last bowel movement 5-6 days ago due to what she thinks is a hemorrhoid but also thinks she can't urinate due to that same hemorrhoid.  She denies blood in stool or urine. She denies fever, abdominal pain, nausea, vomiting or flank pain.  Patient reports her partner had STI testing yesterday and was given a shot of antibiotic but says she isn't sure what that means.  Patient reports live birth 1 year ago and  in 2024. Patient reports taking nothing for symptoms.  No other acute symptoms reported at this time.          PAST MEDICAL HISTORY:   Medical: Pt  has a past medical history of Bipolar 1 disorder (HCC), Borderline personality disorder (HCC), and Mood disorder (HCC).  Surgical: Pt  has no past surgical history on file.  Family: Pt family history includes No Known Problems in her mother.  Social: Pt   Social History     Socioeconomic History    Marital status: Single     Spouse name: Not on file    Number of children: Not on file    Years of education: Not on file    Highest education level: Not on file   Occupational History    Not on file   Tobacco Use    Smoking status: Former     Current packs/day: 0.25     Types: Cigarettes    Smokeless tobacco: Former   Substance and Sexual Activity    Alcohol use: Not Currently    Drug use: Not Currently     Types: Marijuana (Weed)    Sexual activity: Not on file

## 2025-02-09 NOTE — ED NOTES
Patient discharged by provider, discharge instructions provided to patient and reviewed with by provider, all questions answered. Vital signs stable, A/Ox4, breathing unlabored. Patient ambulatory on discharge

## 2025-02-09 NOTE — ED TRIAGE NOTES
Pt presents ambulatory to triage w/ c/o possible rectal fissure. Pt unable to pass gas d/t pain. Pt reports painful rectal bump was seen for it today thinking it was a Hemorid advised it was not a Hemorid. Pt also reports vaginal discharge w/ burning, denies odor and itching

## 2025-02-09 NOTE — ED PROVIDER NOTES
medications:  Medications - No data to display    CONSULTS: (Who and What was discussed)  None    Chronic Conditions: As noted above    Social Determinants affecting Dx or Tx: None    Records Reviewed (source and summary of external records): Nursing Notes and Old Medical Records    [facility] (CC/HPI Summary, DDx, ED Course, Reassessment, Disposition Considerations -Tests not done, Shared Decision Making, Pt Expectation of Test or Tx.):      Patient is a 24-year-old female with history as noted below, who presents to the ED for evaluation of rectal pain.  Patient states she had some bumps around that area that been causing pain.  Patient states she is also been having some vaginal discharge but was seen at urgent care and had gonorrhea and chlamydia swabs sent, states these results and not come back yet (states she was just at urgent care just prior to coming to the ER) but they did treat her empirically for gonorrhea and chlamydia.  Patient states the tender lesions around the rectum only been there for a few days.  She has never had anything like this in the past.  Denies dysuria but state when she voids urine and the urine touches the lesions that that is painful.  Denies difficulty voiding or hematuria.  States she has had some intermittent constipation, she states she is passing gas.  She denies any abdominal pain.  Denies prior history of abdominal surgeries or bowel obstructions.  Denies fevers, chest pain, shortness of breath    Patient had genital swabs for gonorrhea and chlamydia obtained at urgent care prior to arrival      Obtain urinalysis and UPT.  Patient denies dysuria, will send urine for culture  HSV swab sent of perineal ulcerative lesions.  Did empirically place on Valtrex.    See ED course note below for additional MDM       ED Course as of 02/09/25 1206   Sat Feb 08, 2025   2030 Patient endorses generalized constipation, states she is passing gas.  She denies any abdominal pain.  No tenderness on

## 2025-02-09 NOTE — DISCHARGE INSTRUCTIONS
Thank You!    It was a pleasure taking care of you in our Emergency Department today. We know that when you come to [facility], you are entrusting us with your health, comfort, and safety. Our clinicians honor that trust, and truly appreciate the opportunity to care for you and your loved ones.    If you receive a survey about your Emergency Department experience today, please fill it out.  We value your feedback. Thank you.      Lea Sr PA-C    ___________________________________  I have included a copy of your lab results and/or radiologic studies from today's visit so you can have them easily available at your follow-up visit.   Recent Results (from the past 12 hour(s))   POC Pregnancy Urine Qual    Collection Time: 02/08/25  8:15 PM   Result Value Ref Range    Preg Test, Ur Negative NEG         No orders to display     [unfilled]          
"My mom would not let me do what I wanted."

## 2025-02-10 LAB
BACTERIA SPEC CULT: NORMAL
CC UR VC: NORMAL
SERVICE CMNT-IMP: NORMAL

## 2025-02-11 LAB
HSV SPEC CULT: POSITIVE
SPECIMEN SOURCE: ABNORMAL

## 2025-02-13 LAB
A VAGINAE DNA VAG QL NAA+PROBE: ABNORMAL SCORE
BVAB2 DNA VAG QL NAA+PROBE: ABNORMAL SCORE
C ALBICANS DNA VAG QL NAA+PROBE: NEGATIVE
C GLABRATA DNA VAG QL NAA+PROBE: NEGATIVE
C TRACH RRNA SPEC QL NAA+PROBE: NEGATIVE
CANDIDA KRUSEI: NEGATIVE
CANDIDA LUSITANIAE, NAA: NEGATIVE
CANDIDA PARAPSILOSIS/TROPICALIS: NEGATIVE
MEGA1 DNA VAG QL NAA+PROBE: ABNORMAL SCORE
N GONORRHOEA RRNA SPEC QL NAA+PROBE: POSITIVE
T VAGINALIS RRNA SPEC QL NAA+PROBE: NEGATIVE

## 2025-04-26 ENCOUNTER — HOSPITAL ENCOUNTER (EMERGENCY)
Facility: HOSPITAL | Age: 25
Discharge: HOME | End: 2025-04-26
Attending: STUDENT IN AN ORGANIZED HEALTH CARE EDUCATION/TRAINING PROGRAM
Payer: COMMERCIAL

## 2025-04-26 VITALS
HEART RATE: 70 BPM | HEIGHT: 70 IN | TEMPERATURE: 98.2 F | DIASTOLIC BLOOD PRESSURE: 80 MMHG | WEIGHT: 120 LBS | OXYGEN SATURATION: 100 % | BODY MASS INDEX: 17.18 KG/M2 | RESPIRATION RATE: 20 BRPM | SYSTOLIC BLOOD PRESSURE: 117 MMHG

## 2025-04-26 DIAGNOSIS — A60.04 HERPES SIMPLEX VULVOVAGINITIS: Primary | ICD-10-CM

## 2025-04-26 LAB — HCG UR QL: NEGATIVE

## 2025-04-26 PROCEDURE — 81025 URINE PREGNANCY TEST: CPT

## 2025-04-26 PROCEDURE — 99283 EMERGENCY DEPT VISIT LOW MDM: CPT

## 2025-04-26 PROCEDURE — 6370000000 HC RX 637 (ALT 250 FOR IP): Performed by: STUDENT IN AN ORGANIZED HEALTH CARE EDUCATION/TRAINING PROGRAM

## 2025-04-26 RX ORDER — ACYCLOVIR 200 MG/1
800 CAPSULE ORAL 2 TIMES DAILY
Qty: 40 CAPSULE | Refills: 0 | Status: SHIPPED | OUTPATIENT
Start: 2025-04-26 | End: 2025-05-01

## 2025-04-26 RX ORDER — ACYCLOVIR 200 MG/1
800 CAPSULE ORAL ONCE
Status: COMPLETED | OUTPATIENT
Start: 2025-04-26 | End: 2025-04-26

## 2025-04-26 RX ADMIN — ACYCLOVIR 800 MG: 200 CAPSULE ORAL at 04:29

## 2025-04-26 ASSESSMENT — PAIN SCALES - GENERAL: PAINLEVEL_OUTOF10: 5

## 2025-04-26 ASSESSMENT — LIFESTYLE VARIABLES
HOW MANY STANDARD DRINKS CONTAINING ALCOHOL DO YOU HAVE ON A TYPICAL DAY: PATIENT DOES NOT DRINK
HOW OFTEN DO YOU HAVE A DRINK CONTAINING ALCOHOL: NEVER

## 2025-04-26 ASSESSMENT — PAIN DESCRIPTION - DESCRIPTORS: DESCRIPTORS: BURNING

## 2025-04-26 ASSESSMENT — PAIN - FUNCTIONAL ASSESSMENT: PAIN_FUNCTIONAL_ASSESSMENT: 0-10

## 2025-04-26 ASSESSMENT — PAIN DESCRIPTION - PAIN TYPE: TYPE: ACUTE PAIN

## 2025-04-26 ASSESSMENT — PAIN DESCRIPTION - LOCATION: LOCATION: VAGINA

## 2025-04-26 NOTE — ED PROVIDER NOTES
[facility] EM[facility] DEPARTMENT  EMERGENCY DEPARTMENT ENCOUNTER      Pt Name: Vilma Jara  MRN: [id number]  Birthdate 2000  Date of evaluation: 4/26/2025  Provider: Isela Zarate DO    CHIEF COMPLAINT       Chief Complaint   Patient presents with    Herpes outbreak    Pregnancy Test       PMH   Past Medical History:   Diagnosis Date    Bipolar 1 disorder (HCC)     Borderline personality disorder (HCC)     Mood disorder          MDM:   Vitals:    Vitals:    04/26/25 0409   BP: 117/80   Pulse: 70   Resp: 20   Temp: 98.2 °F (36.8 °C)   SpO2: 100%           This is a 24 y.o. female with pmhx bipolar 1, borderline personality disorder, schizophrenia, polysubstance abuse who presents today for cc of concern for herpes . Patient states she was diagnosed with genital herpes 2 months ago, was treated and was otherwise doing well until a few days ago when she started getting a burning pain on her left labia and is concerned she has herpes again. She also requests a pregnancy test today . Otherwise denies chest pain, dyspnea, fever, chills, abdominal pain, nausea, vomiting, urinary symptoms, and changes in bowel habits.She does note some emotional distress currently, and admits to prior drug abuse but denies any current ingestions. Somewhat difficult to redirect but does specifically deny SI, HI, hallucinations. States she is getting herself together to get custody of her child and just wanted to make sure she was getting the treatment she needed.     On arrival VS stable.   General: Alert, no acute distress  HEENT: Normocephalic, atraumatic. EOMI,  moist oral mucosa, no conjunctival injection  Neck: ROM normal, supple  Cardio: Heart regular rate and regular rhythm   Lungs: No respiratory distress  Abdomen: soft nontender  Pelvic: chaperoned external exam shows herpes vesicles on the left labia, patient declines speculum exam  MSK: ROM normal  Skin: Warm, dry, no rash  Neuro: No focal neurodeficits, AOx3   Psych:      DISPOSITION/PLAN   DISPOSITION Decision To Discharge 04/26/2025 04:47:14 AM      PATIENT REFERRED TO:  Your primary care doctor    Call in 1 day  Regarding your ER visit today    Emergency Department    Go to   If symptoms worsen      DISCHARGE MEDICATIONS:  Discharge Medication List as of 4/26/2025  4:45 AM        START taking these medications    Details   acyclovir (ZOVIRAX) 200 MG capsule Take 4 capsules by mouth 2 times daily for 5 days, Disp-40 capsule, R-0Normal               (Please note that portions of this note were completed with a voice recognition program.  Efforts were made to edit the dictations but occasionally words are mis-transcribed.)    Isela Zarate DO (electronically signed)  Emergency Attending Physician / Physician Assistant / Nurse Practitioner             Isela Zarate DO  04/26/25 4594

## 2025-04-26 NOTE — ED TRIAGE NOTES
Patient arrived to ED ambulatory with cc of herpes outbreak. Patient has heightened emotions during triage and took several staff members to speak with her to get what brought her in. Patient jumps thoughts from her past drug use and diagnosis and being upset with her ex partner.     Patient was able to express she was experiencing anxiety because of her genital herpes outbreak. Reports she is unsure where her outbreak is but reports when her vaginal area touches she feels pain and shaking. MD in at bedside during triage. Patient agreed to external vaginal exam by MD and nurse chaperone. Open lesions observed on left labia.  Patient also reports she is concerned for positive pregnancy and wants testing done.    Patient reports she does have psychiatric illnesses and drug use disorder. Reports she just had a full evaluation done 2 days ago and has a counselor working with her. Patient denies SI or HI at this time. Patient declined wanting to speak with [facility] at this time.